# Patient Record
Sex: FEMALE | Race: WHITE | URBAN - METROPOLITAN AREA
[De-identification: names, ages, dates, MRNs, and addresses within clinical notes are randomized per-mention and may not be internally consistent; named-entity substitution may affect disease eponyms.]

---

## 2022-03-16 ENCOUNTER — TELEPHONE (OUTPATIENT)
Dept: ENDOCRINOLOGY | Age: 77
End: 2022-03-16

## 2022-04-20 ENCOUNTER — OFFICE VISIT (OUTPATIENT)
Dept: ENDOCRINOLOGY | Age: 77
End: 2022-04-20
Payer: MEDICARE

## 2022-04-20 VITALS
BODY MASS INDEX: 22.56 KG/M2 | WEIGHT: 140.4 LBS | DIASTOLIC BLOOD PRESSURE: 79 MMHG | SYSTOLIC BLOOD PRESSURE: 139 MMHG | HEIGHT: 66 IN

## 2022-04-20 DIAGNOSIS — M81.0 OSTEOPOROSIS, POSTMENOPAUSAL: ICD-10-CM

## 2022-04-20 DIAGNOSIS — M81.0 OSTEOPOROSIS, POSTMENOPAUSAL: Primary | ICD-10-CM

## 2022-04-20 LAB — VITAMIN D 25-HYDROXY: 76.4 NG/ML

## 2022-04-20 PROCEDURE — 99205 OFFICE O/P NEW HI 60 MIN: CPT | Performed by: INTERNAL MEDICINE

## 2022-04-20 PROCEDURE — G2212 PROLONG OUTPT/OFFICE VIS: HCPCS | Performed by: INTERNAL MEDICINE

## 2022-04-20 RX ORDER — M-VIT,TX,IRON,MINS/CALC/FOLIC 27MG-0.4MG
1 TABLET ORAL DAILY
COMMUNITY

## 2022-04-20 RX ORDER — LISINOPRIL 20 MG/1
TABLET ORAL
COMMUNITY
Start: 2022-02-17

## 2022-04-20 RX ORDER — DENOSUMAB 60 MG/ML
60 INJECTION SUBCUTANEOUS ONCE
Qty: 1 ML | Refills: 0 | Status: SHIPPED | OUTPATIENT
Start: 2022-04-20 | End: 2022-04-20

## 2022-04-20 RX ORDER — OMEGA-3/DHA/EPA/FISH OIL 300-1000MG
2 CAPSULE ORAL DAILY
COMMUNITY

## 2022-04-20 RX ORDER — METOPROLOL TARTRATE 50 MG/1
50 TABLET, FILM COATED ORAL DAILY
COMMUNITY
Start: 2022-04-07

## 2022-04-20 RX ORDER — ZOLPIDEM TARTRATE 5 MG/1
TABLET ORAL
COMMUNITY
Start: 2022-03-28

## 2022-04-20 RX ORDER — PHENOL 1.4 %
AEROSOL, SPRAY (ML) MUCOUS MEMBRANE
COMMUNITY

## 2022-04-20 RX ORDER — VIT C/B6/B5/MAGNESIUM/HERB 173 50-5-6-5MG
CAPSULE ORAL
COMMUNITY

## 2022-04-20 RX ORDER — PHENOL 1.4 %
1 AEROSOL, SPRAY (ML) MUCOUS MEMBRANE DAILY
COMMUNITY

## 2022-04-20 NOTE — LETTER
200 [facility] and Osteoporosis  [facility] 900 [facility], [address] [place] [address],[place] [address]  Phone [phone]  Fax [phone]         2022         Cecille Lerma MD                            Re:  Wolfgang Neal,  1945    Dear Dr. Daniel Marshall:     Thank you for asking me to see Wolfgang Neal in consultation. As you know, Ms. Alva Puente is a 64 y.o. woman found to have low bone density in . BMD decreased 4636-8897, T-scores -2.0 in the left femoral neck, -2.8 in the left 1/3 radius. Using FRAX, her 10-year fracture risk high, above the recommended intervention thresholds, so pharmacologic therapy to reduce fracture risk is recommended. We reviewed life-style issues (calcium, vitamin D and physical activity). We discussed long-term treatment options (alendronate, zoledronate, Prolia). She selected denosumab SQ twice yearly (Prolia). We will make the necessary arrangements. Enclosed is a copy of my consultation note. Please let me know if you have any questions. Sincerely,    Bita Maravilla. [email or web address]     Encl.  Copy of consult note      CC: Raul Killian MD

## 2022-04-20 NOTE — PROGRESS NOTES
[facility] ([facility]) Osteoporosis and [address] [facility] [address] [facility], [address] [place] [address],[place] [address]  Phone [phone]  Fax [phone]    NAME:  Steffany Mendoza  :  1945  CONSULT DATE:    2022  MOST RECENT VISIT:  2022  TODAYS DATE:  2022    Labs @ [facility] 10/2021    CONSULTATION REQUESTED BY: Alex Castillo MD  OTHERS WHO NEED REPORTS: [place] MD    PROBLEMS. Low bone density by DXA 2006, lowest T-score -1.7 in the left femoral neck    Family history of osteoporosis, sister    BMD decreased 0753-7367, lowest T-scores -2.0 in the left femoral neck, -2.8 in the left 1/3 radius    Concerns about Fosamax  Natural menopause age 46  Decreased kidney function 10/2021 Cr 1.0 GFR 52. Colon cancer 1982  Lymphoma , chemo 4 months in 7681; uncertain if steroids were included  Hypertension  Right hip replacement for arthritis 2020    CURRENT MANAGEMENT FOR BONE HEALTH/OSTEOPOROSIS. Calcium, 300 mg from low calcium foods,  300 mg milk, 300 mg cheese, 200 mg dk green vegs   diet MVI Ca+D other total    Calcium 1100 200 600*   mg/d   Vitamin D  1000 800* 4000  IU/d   *Takes calcium + D 3 times a week    25-OH D 43 ng/mL 2018 (desirable is 30-60 ng/mL)  Exercise, walks 1 hour 4 x weekly  Pharmacologic therapy: aspirin, fish oil, B12, turmeric    PREVIOUS BONE-ACTIVE MEDICATIONS. none    OTHER CURRENT MEDICATIONS (SELECTED): Rituxan, lisinopril, metoprolol  OTC MEDICATIONS (SELECTED): none    CHIEF COMPLAINT. Just diagnosed with osteoporosis    HISTORY OF PRESENT ILLNESS: See problem list for chronic/inactive conditions. Ms. Paulie Herrera is a 43-year-old woman who was found to have ow bone density by DXA in 2006. FOR FULL DETAILS OF FAMILY HISTORY, PAST MEDICAL AND SURGICAL HISTORY, SOCIAL HISTORY, AND REVIEW OF SYSTEMS, SEE PATIENT QUESTIONNAIRE OF TODAYS DATE. FAMILY HISTORY. Relevant hx in problem list and/or HPI. Otherwise not contributory. PAST MEDICAL HISTORY.   Noted in health history form. PAST SURGICAL HISTORY. Noted in health history form. SOCIAL HISTORY. Past smoker (quit 1975). No excessive intake of alcohol, caffeine or sodas. Lives alone. REVIEW OF SYSTEMS. Maximum adult height 66. No significant height loss. Usual weight 140#. No recent significant change in weight. PHYSICAL EXAMINATION. GENERAL. Well-nourished, well-developed, normally proportioned adult. MENTAL STATUS. Pleasant mood. Oriented to time, place, and person. ORAL. Teeth appear to be in good condition. SKIN. Normal texture and turgor. LUNGS. Clear to auscultation. Breath sounds normal.  HEART. Heart sounds normal, no murmur or gallop. MUSCULOSKELETAL. The examination included inspection/palpation (any misalignment, asymmetry, crepitation, defects, tenderness, masses, effusions is noted), assessment of range of motion (any presence of pain, crepitation, contracture is noted), assessment of stability (any dislocation, subluxation, laxity is noted), assessment of muscle strength and tone (any atrophy or abnormal movements is noted). Pelvis appears normal.  Spinal contours are normal.  No spine tenderness to palpation or percussion. Three finger spaces between ribs and pelvis. Gait steady without assistance. NEUROLOGICAL. Able to rise from chair without using arms. No apparent motor or sensory deficit. Coordination appears normal     BONE DENSITY. Most recent done at [facility]. 2022 left 1/3 radius -2.8. T-scores   Initial study: 01/25/2006 L1-L4 -0.9 left fem. neck -1.7   Current study: 01/12/2022 L1-L4 -0.9 left fem. neck -2.0     The table below shows bone mineral density (grams/cm2), the appropriate measure for comparing serial scans. A significant increase or decrease is based on precision studies done at our center according to the ISCD protocol with a least significant change of 0.030 g/cm2. PA spine Proximal Femur (left)   Date L1-L4  Fem. neck Trochanter Total hip   01/25/2006 0.948 0.666 --- 0.854   03/24/2015 0.938 0.661 --- 0.848   05/19/2017 0.923 0.658 --- 0.828   01/12/2022 0.948 0.630 --- 0.810      Osteoporosis, bone density lower than desirable. It decreased in the femoral neck and total hip 2589-3579. Labs: 10/2021 Ca 9.5 Cr 1.0 GFR 52. Imaging: DXA printouts reviewed. ASSESSMENT. Bone density lower than desirable. Using FRAX, her 10-year fracture risk high, above the recommended intervention thresholds, so pharmacologic therapy to reduce fracture risk is recommended. THERAPEUTIC PLANS. Calcium, target 1200 mg daily; we discussed recommended calcium intake and the effects of excessive calcium intake from supplements. I provided a handout with information about how to calculate daily calcium intake. Advised to stop calcium supplement. She will be losing 800 IU vitamin D 3 x weekly. Vitamin D, recommended amount to be determined after review of 25-OH D lab result. Exercise, current exercise program is fine. Pharmacologic therapy, we discussed long-term treatment options for osteoporosis that have evidence for broad spectrum anti-fracture efficacy (reduce the risk of vertebral, hip and other nonvertebral fractures); alendronate (Fosamax), zoledronate (Reclast) and Prolia (denosumab). I explained dosing instructions, the mechanism of action, side effects (which are uncommon) and safety concerns (which are rare). I would also consider risedronate (Actonel) but it usually more expensive than alendronate. Several friends and family members have had problems with Fosamax. The patient selected Prolia. We will make the necessary arrangements. Return appointment with DXA in 1 year. Total time on the date the encounter was 75-89 minutes. Simon Bonilla MD, Director, [facility]) Osteoporosis and Bone Health Services    CC: Abel Diaz MD

## 2022-05-02 ENCOUNTER — TELEPHONE (OUTPATIENT)
Dept: ENDOCRINOLOGY | Age: 77
End: 2022-05-02

## 2022-05-10 NOTE — TELEPHONE ENCOUNTER
Prolia PA submitted today 5/10/2022. Insurance states:    Prior authorization is not required for the selected combination of member, drug, and health plan.    Reference number: [id number]

## 2022-05-20 ENCOUNTER — TELEPHONE (OUTPATIENT)
Dept: ENDOCRINOLOGY | Age: 77
End: 2022-05-20

## 2022-05-20 NOTE — TELEPHONE ENCOUNTER
Pt states she never rec'd a call back regarding her Prolia. If she can schedule or not?     # [phone]

## 2022-05-23 NOTE — TELEPHONE ENCOUNTER
Patient aware that no prior authorization is required and she scheduled her appointment for the prolia injection

## 2022-05-27 ENCOUNTER — NURSE ONLY (OUTPATIENT)
Dept: ENDOCRINOLOGY | Age: 77
End: 2022-05-27
Payer: MEDICARE

## 2022-05-27 DIAGNOSIS — M81.0 OSTEOPOROSIS, POSTMENOPAUSAL: Primary | ICD-10-CM

## 2022-05-27 PROCEDURE — 96372 THER/PROPH/DIAG INJ SC/IM: CPT | Performed by: INTERNAL MEDICINE

## 2022-05-27 NOTE — PROGRESS NOTES
Prolia injection given to Dr. chase         Patient. Jay Maier [phone]. Lot #   [id number]            Exp:    07/24       Injection given in          Arm. Patient advised to wait in office 20 minutes in case of reaction such as SOB, rash, hives, itching.

## 2022-11-30 ENCOUNTER — NURSE ONLY (OUTPATIENT)
Dept: ENDOCRINOLOGY | Age: 77
End: 2022-11-30
Payer: MEDICARE

## 2022-11-30 DIAGNOSIS — M81.0 OSTEOPOROSIS, POSTMENOPAUSAL: Primary | ICD-10-CM

## 2022-11-30 PROCEDURE — 96372 THER/PROPH/DIAG INJ SC/IM: CPT | Performed by: INTERNAL MEDICINE

## 2023-05-06 PROBLEM — Z51.81 MEDICATION MONITORING ENCOUNTER: Status: ACTIVE | Noted: 2023-05-06

## 2023-05-06 PROBLEM — N18.30 STAGE 3 CHRONIC KIDNEY DISEASE (HCC): Status: ACTIVE | Noted: 2023-05-06

## 2023-06-05 ENCOUNTER — OFFICE VISIT (OUTPATIENT)
Dept: ENDOCRINOLOGY | Age: 78
End: 2023-06-05

## 2023-06-05 ENCOUNTER — HOSPITAL ENCOUNTER (OUTPATIENT)
Dept: GENERAL RADIOLOGY | Age: 78
Discharge: HOME | End: 2023-06-05
Payer: MEDICARE

## 2023-06-05 ENCOUNTER — PROCEDURE VISIT (OUTPATIENT)
Dept: ENDOCRINOLOGY | Age: 78
End: 2023-06-05

## 2023-06-05 VITALS
WEIGHT: 140 LBS | DIASTOLIC BLOOD PRESSURE: 94 MMHG | SYSTOLIC BLOOD PRESSURE: 144 MMHG | HEIGHT: 66 IN | BODY MASS INDEX: 22.5 KG/M2

## 2023-06-05 DIAGNOSIS — M81.0 OSTEOPOROSIS, POSTMENOPAUSAL: Primary | ICD-10-CM

## 2023-06-05 DIAGNOSIS — N18.30 STAGE 3 CHRONIC KIDNEY DISEASE, UNSPECIFIED WHETHER STAGE 3A OR 3B CKD (HCC): ICD-10-CM

## 2023-06-05 DIAGNOSIS — M81.0 OSTEOPOROSIS, POSTMENOPAUSAL: ICD-10-CM

## 2023-06-05 DIAGNOSIS — Z51.81 MEDICATION MONITORING ENCOUNTER: ICD-10-CM

## 2023-06-05 PROCEDURE — 77080 DXA BONE DENSITY AXIAL: CPT | Performed by: INTERNAL MEDICINE

## 2023-06-05 PROCEDURE — 77080 DXA BONE DENSITY AXIAL: CPT

## 2023-06-05 NOTE — RESULT ENCOUNTER NOTE
[facility] ([facility]) Osteoporosis and 215 [facility] 900 [facility], [address] [place] [address],[place] [address]  Phone [phone]  Fax [phone]    NAME: Ida Fajardo   : 1945   STUDY DATE: 2023     REFERRING PROVIDER: Brook Martell MD    INDICATION(S) FOR PERFORMING THE STUDY:  osteoporosis, age related (M81.0)    CLINICAL INFORMATION PROVIDED BY THE PATIENT: 75-year-old woman. No history of fragility fractures. No long-term corticosteroid use. Current treatment is Prolia started 2022. Family history of osteoporosis (sister). EQUIPMENT: [employer]. POSITIONING: Good. REGIONS OF INTEREST: Correct. ARTIFACTS: None. STUDY VALID? Yes. Spine BMD is spuriously high because of generalized degenerative change. No adjustments were made. T-scores   Initial study: 2006 L1-L4 -0.9 left fem. neck -1.7   Current study: 2023 L1-L4 -0.5 left fem. neck -1.9     The table below shows bone mineral density (grams/cm2), the appropriate measure for comparing serial scans. A significant increase or decrease is based on precision studies done at our center according to the ISCD protocol with a least significant change of 0.030 g/cm2. PA spine Proximal Femur (left)   Date L1-L4  Fem. neck Trochanter Total hip   2006 0.948 0.666 --- 0.854   2015 0.938 0.661 --- 0.848   2017 0.923 0.658 --- 0.828   2022 0.948 0.630 --- 0.810   2023 0.997 0.633 0.651 0.801     IMPRESSION:  BONE DENSITY IS LOW. SINCE THE LAST DXA, BMD INCREASED IN THE SPINE AND DID NOT CHANGE SIGNIFICANTLY IN THE LEFT HIP. Consider repeating this study in 1-2 years to assess the patient's progress. _________________________________________________   Leo Peaches  Bonilla MD, Director, [facility] ([facility]) Osteoporosis and [address] [place] [address]

## 2023-06-05 NOTE — PROGRESS NOTES
[facility] ([facility]) Osteoporosis and [address] [facility] [address] [facility], [address] [facility],[place] [address]  Phone [phone]  Fax [phone]    NAME:  Patricia Acosta  :  1945  CONSULT DATE:    2022  MOST RECENT VISIT:  2022  TODAY'S DATE:  2023    Labs @ [facility] 2023    PROBLEMS. Low bone density by DXA 2006, lowest T-score -1.7 in the left femoral neck, left 1/3 radius -2.8 in     Family history of osteoporosis, sister    BMD decreased 3528-5083, lowest T-scores -2.0 in the left femoral neck, -2.8 in the left 1/3 radius    Concerns about Fosamax  Natural menopause age 46  Decreased kidney function 10/2021 Cr 1.0 GFR 52. Colon cancer 1982  Lymphoma , chemo 4 months in 5342; uncertain if steroids were included  Hypertension  Right hip replacement for arthritis 2020    CURRENT MANAGEMENT FOR BONE HEALTH/OSTEOPOROSIS. Calcium, 300 mg from low calcium foods,  300 mg milk, 300 mg cheese, 200 mg dk green vegs   diet MVI Ca+D other total    Calcium 1100 200 600*   mg/d   Vitamin D  1000 800* 2000  IU/d   *Takes calcium + D 3 times a week    25-OH D 76 ng/mL 2022 (desirable is 30-60 ng/mL) with 4000 IU/d, reduced to 2000 IU 2022  Exercise, walks 1 hour 4 x weekly  Pharmacologic therapy:  Prolia 60 mg SQ twice yearly started 2022    PREVIOUS BONE-ACTIVE MEDICATIONS. none    OTHER CURRENT MEDICATIONS (SELECTED): Rituxan, lisinopril, metoprolol  OTC MEDICATIONS (SELECTED): aspirin, fish oil, B12, turmeric    CHIEF COMPLAINT. Here for f/u of osteoporosis, decreased kidney function, monitoring treatment. No new related signs or symptoms. INTERVAL HISTORY:  See problem list for chronic/inactive conditions. She received Prolia without side effects. No falls, near-falls or fractures. Feels well overall. FOR FULL DETAILS OF FAMILY HISTORY, PAST MEDICAL AND SURGICAL HISTORY, SOCIAL HISTORY, AND REVIEW OF SYSTEMS, SEE PATIENT QUESTIONNAIRE OF TODAY'S DATE.     PHYSICAL

## 2023-06-05 NOTE — PATIENT INSTRUCTIONS
Prolia supplied by the physician office. Informed patient if any signs of redness,rash,swelling or unusual symptoms occur, please contact the office. Prolia given per physician order. It is the patient's responsibility to notify the physician office of new insurance plan or new identification number prior to appointment to prevent delay in treatment. Please send a copy of the front and back of the insurance card either by fax, mail or stop by the office. Patient to follow up with her PCP this week.   /94

## 2023-11-07 ENCOUNTER — TELEPHONE (OUTPATIENT)
Dept: ENDOCRINOLOGY | Age: 78
End: 2023-11-07

## 2023-11-07 NOTE — TELEPHONE ENCOUNTER
Eveyln HARPER is approved:    Your Authorization Request Has Been Approved  Your authorization request number is [id number].   Authorization Status  Approved  Authorization Number  [id number]   Authorization Start Date  11-  Authorization End Date  11-

## 2023-12-08 ENCOUNTER — NURSE ONLY (OUTPATIENT)
Dept: ENDOCRINOLOGY | Age: 78
End: 2023-12-08
Payer: MEDICARE

## 2023-12-08 DIAGNOSIS — M81.0 OSTEOPOROSIS, POSTMENOPAUSAL: Primary | ICD-10-CM

## 2023-12-08 PROCEDURE — 96372 THER/PROPH/DIAG INJ SC/IM: CPT | Performed by: INTERNAL MEDICINE

## 2024-07-08 ENCOUNTER — OFFICE VISIT (OUTPATIENT)
Dept: ENDOCRINOLOGY | Age: 79
End: 2024-07-08
Payer: MEDICARE

## 2024-07-08 ENCOUNTER — HOSPITAL ENCOUNTER (OUTPATIENT)
Dept: GENERAL RADIOLOGY | Age: 79
Discharge: HOME | End: 2024-07-08
Payer: MEDICARE

## 2024-07-08 VITALS — BODY MASS INDEX: 23.14 KG/M2 | HEIGHT: 66 IN | WEIGHT: 144 LBS

## 2024-07-08 DIAGNOSIS — Z51.81 MEDICATION MONITORING ENCOUNTER: ICD-10-CM

## 2024-07-08 DIAGNOSIS — M81.0 OSTEOPOROSIS, POSTMENOPAUSAL: Primary | ICD-10-CM

## 2024-07-08 DIAGNOSIS — M81.0 OSTEOPOROSIS, POSTMENOPAUSAL: ICD-10-CM

## 2024-07-08 DIAGNOSIS — N18.30 STAGE 3 CHRONIC KIDNEY DISEASE, UNSPECIFIED WHETHER STAGE 3A OR 3B CKD (HCC): ICD-10-CM

## 2024-07-08 PROCEDURE — 77080 DXA BONE DENSITY AXIAL: CPT | Performed by: INTERNAL MEDICINE

## 2024-07-08 PROCEDURE — 77080 DXA BONE DENSITY AXIAL: CPT

## 2024-07-08 PROCEDURE — 99214 OFFICE O/P EST MOD 30 MIN: CPT | Performed by: INTERNAL MEDICINE

## 2024-07-08 PROCEDURE — 96372 THER/PROPH/DIAG INJ SC/IM: CPT | Performed by: INTERNAL MEDICINE

## 2024-07-08 PROCEDURE — 1123F ACP DISCUSS/DSCN MKR DOCD: CPT | Performed by: INTERNAL MEDICINE

## 2024-07-08 RX ORDER — NIFEDIPINE 30 MG/1
30 TABLET, EXTENDED RELEASE ORAL DAILY
COMMUNITY
Start: 2024-04-17

## 2024-07-08 NOTE — PROGRESS NOTES
Informed patient if any signs of redness,rash,swelling or unusual symptoms occur, please contact the office. Prolia given per physician order.  
EXAMINATION.   GENERAL.  Well-nourished, well-developed, normally proportioned adult.   MENTAL STATUS. Pleasant mood.  Oriented to time, place, and person.  ORAL. Teeth appear to be in good condition.  MUSCULOSKELETAL.  Spinal contours are normal.  No spine tenderness to palpation or percussion.   Three finger spaces between ribs and pelvis.  Gait steady without assistance.   NEUROLOGICAL. Able to rise from chair without using arms. No apparent motor or sensory deficit. Coordination appears normal     BONE DENSITY.  Most recent done here [employer] equipment.     T-scores   Initial study: 01/25/2006 L1-L2 -0.7 left fem. neck -1.7   Current study: 07/08/2024 L1-L2 -0.5 left fem. neck -1.9     The table below shows bone mineral density (grams/cm2), the appropriate measure for comparing serial scans. A significant “increase” or “decrease” is based on precision studies done at our center according to the ISCD protocol with a least significant change of 0.030 g/cm2.     PA spine Proximal Femur (left)   Date L1-L2 Fem. neck Trochanter Total hip   01/25/2006 0.897 0.666 --- 0.854   05/19/2017 0.889 0.658 --- 0.828   01/12/2022 0.837 0.630 --- 0.810   06/05/2023 0.920 0.633 0.651 0.801   07/08/2024 0.905 0.641 0.652 0.817     IMPRESSION:  BONE DENSITY IS LOW.  SINCE THE LAST DXA, BMD DID NOT CHANGE SIGNIFICANTLY IN THE SPINE OR LEFT HIP.  COMPARED WITH 2022, BEFORE STARTING PROLIA, BMD IS HIGHER NOW IN THE SPINE.     Labs: 10/2021 Ca 9.5 Cr 1.0 GFR 52. 01/2023 Ca 8.9 Cr 0.7. 02/2024 Ca 9.4 Cr 1.0 GFR 55.  Imaging: DXA printouts reviewed. 07/2022 chest abd CT.  01/2023 abd CT.    ASSESSMENT.  Bone density lower than desirable.  Without effective therapy, risk of fracture is high.  She is doing well with Prolia started 04/2022.    PLANS. OK to give Prolia today and continue treatment with Prolia 60 mg SQ twice yearly.    Return with DXA in 1 year. Total time 30-39 minutes.      Eron Bonilla MD, Director, [facility]

## 2024-12-12 ENCOUNTER — TELEPHONE (OUTPATIENT)
Dept: ENDOCRINOLOGY | Age: 79
End: 2024-12-12

## 2025-01-08 ENCOUNTER — TELEPHONE (OUTPATIENT)
Dept: ENDOCRINOLOGY | Age: 80
End: 2025-01-08

## 2025-01-08 NOTE — TELEPHONE ENCOUNTER
Spoke with [employer] to check on Summary of Benefits for Prolia verification. They did state that both insurances checked out were good but still getting processed. Will need a PA, patient aware.

## 2025-01-15 ENCOUNTER — TELEPHONE (OUTPATIENT)
Dept: ENDOCRINOLOGY | Age: 80
End: 2025-01-15

## 2025-01-15 NOTE — TELEPHONE ENCOUNTER
Prolia approved    Your Authorization Request Has Been Approved    Your authorization request number is [id number].      Authorization Start Date 01-     Authorization End Date 01-     If this requires a response please respond to the pool ( P [facility]X P[facility] MEDICATION PRE-AUTH).      Thank you please advise patient.

## 2025-01-22 ENCOUNTER — NURSE ONLY (OUTPATIENT)
Dept: ENDOCRINOLOGY | Age: 80
End: 2025-01-22
Payer: MEDICARE

## 2025-01-22 DIAGNOSIS — M81.0 OSTEOPOROSIS, POSTMENOPAUSAL: Primary | ICD-10-CM

## 2025-01-22 PROCEDURE — 96372 THER/PROPH/DIAG INJ SC/IM: CPT | Performed by: INTERNAL MEDICINE

## 2025-06-26 DIAGNOSIS — M81.0 OSTEOPOROSIS, POSTMENOPAUSAL: Primary | ICD-10-CM

## 2025-08-04 ENCOUNTER — HOSPITAL ENCOUNTER (OUTPATIENT)
Dept: GENERAL RADIOLOGY | Age: 80
Discharge: HOME | End: 2025-08-04
Payer: MEDICARE

## 2025-08-04 ENCOUNTER — OFFICE VISIT (OUTPATIENT)
Dept: ENDOCRINOLOGY | Age: 80
End: 2025-08-04
Payer: MEDICARE

## 2025-08-04 VITALS — HEIGHT: 66 IN | WEIGHT: 144.6 LBS | BODY MASS INDEX: 23.24 KG/M2

## 2025-08-04 DIAGNOSIS — M81.0 OSTEOPOROSIS, POSTMENOPAUSAL: Primary | ICD-10-CM

## 2025-08-04 DIAGNOSIS — M81.0 OSTEOPOROSIS, POSTMENOPAUSAL: ICD-10-CM

## 2025-08-04 PROCEDURE — 96372 THER/PROPH/DIAG INJ SC/IM: CPT | Performed by: INTERNAL MEDICINE

## 2025-08-04 PROCEDURE — 77080 DXA BONE DENSITY AXIAL: CPT | Performed by: INTERNAL MEDICINE

## 2025-08-04 PROCEDURE — 77080 DXA BONE DENSITY AXIAL: CPT

## 2025-08-04 PROCEDURE — 1159F MED LIST DOCD IN RCRD: CPT | Performed by: INTERNAL MEDICINE

## 2025-08-04 PROCEDURE — 1123F ACP DISCUSS/DSCN MKR DOCD: CPT | Performed by: INTERNAL MEDICINE

## 2025-08-04 PROCEDURE — 99214 OFFICE O/P EST MOD 30 MIN: CPT | Performed by: INTERNAL MEDICINE

## 2025-08-04 RX ORDER — MAGNESIUM GLUCONATE 27 MG(500)
500 TABLET ORAL
COMMUNITY

## 2025-08-04 RX ORDER — HYDROCHLOROTHIAZIDE 25 MG/1
25 TABLET ORAL DAILY
COMMUNITY
Start: 2025-06-30

## 2025-08-05 LAB — 25(OH)D3 SERPL-MCNC: 60.6 NG/ML
